# Patient Record
Sex: FEMALE
[De-identification: names, ages, dates, MRNs, and addresses within clinical notes are randomized per-mention and may not be internally consistent; named-entity substitution may affect disease eponyms.]

---

## 2023-01-18 PROBLEM — Z00.00 ENCOUNTER FOR PREVENTIVE HEALTH EXAMINATION: Status: ACTIVE | Noted: 2023-01-18

## 2023-02-02 ENCOUNTER — APPOINTMENT (OUTPATIENT)
Dept: ORTHOPEDIC SURGERY | Facility: CLINIC | Age: 63
End: 2023-02-02
Payer: COMMERCIAL

## 2023-02-02 DIAGNOSIS — M18.11 UNILATERAL PRIMARY OSTEOARTHRITIS OF FIRST CARPOMETACARPAL JOINT, RIGHT HAND: ICD-10-CM

## 2023-02-02 PROCEDURE — 99203 OFFICE O/P NEW LOW 30 MIN: CPT

## 2023-02-02 PROCEDURE — 73110 X-RAY EXAM OF WRIST: CPT | Mod: RT

## 2023-02-02 PROCEDURE — 73140 X-RAY EXAM OF FINGER(S): CPT | Mod: RT

## 2023-02-02 NOTE — ASSESSMENT
[FreeTextEntry1] : The condition was explained to the patient.\par Discussed that arthritis is progressive, but has a remitting and relapsing course. Discussed treatment ladder - observation, oral NSAIDs, bracing, steroid injection, and ultimately surgery if necessary. \par - recommend thumb spica splint PRN. can use comfort cool thumb CMCJ splint if this is too restrictive.\par - activity modification, OTC adaptive tools/, moist heat PRN.\par - recommend OTC pain medications such as Tylenol and NSAIDs as needed. reviewed contra-indicated medical conditions (eg liver disease, kidney disease, or GI ulcer/bleeding) or medications (eg blood thinners). Discussed possible GI and blood pressure side effects.\par \par F/u PRN.

## 2023-02-02 NOTE — HISTORY OF PRESENT ILLNESS
[6] : 6 [Dull/Aching] : dull/aching [Throbbing] : throbbing [Intermittent] : intermittent [de-identified] : 2/2/23: 63yo RHD female () presents for RIGHT thumb and wrist pain since Summer 2022. Denies injury.\par Saw an orthopedist, Dr. Abel Sevilla at [facility], on 11/1/22 => CSI of RIGHT thumb CMCJ, which resolved the pain temporarily. On review of his office notes, she had previously seen another physician for RIGHT DeQuervain's tenosynovitis and LEFT trigger thumb.\par Using Voltaren gel without relief.\par \par Hx: hypothyroid. alopecia. [] : no [FreeTextEntry5] : 62 year old F is here for Right Thumb/Wrist. Pt states during the summer the started and no radiating pain, pt states having CSI (11/2022) at ([facility]) it worked and then the pain started again, Pt states sometimes has problems trying to bend the Right Thumb, mainly the pain is in her Right Wrist. 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

## 2023-02-02 NOTE — IMAGING
[de-identified] : RIGHT HAND\par skin intact. no swelling.\par TTP to thumb CMCJ.\par good wrist extension, flexion. good pronation, supination.\par good EPL, FPL. good finger extension, flex to full fist. good finger abduction and adduction. \par SILT to median, ulnar, radial distribution. \par palpable radial pulse, brisk cap refill all digits.\par no triggering.\par negative Tinel's at carpal tunnel.\par \par \par XRAYS OF RIGHT WRIST: no acute displaced fracture or dislocation. djd of thumb CMCJ.\par XRAYS OF RIGHT THUMB: no acute displaced fracture or dislocation. djd of thumb CMCJ.

## 2024-09-17 ENCOUNTER — NON-APPOINTMENT (OUTPATIENT)
Age: 64
End: 2024-09-17